# Patient Record
Sex: FEMALE | Race: WHITE | NOT HISPANIC OR LATINO | URBAN - METROPOLITAN AREA
[De-identification: names, ages, dates, MRNs, and addresses within clinical notes are randomized per-mention and may not be internally consistent; named-entity substitution may affect disease eponyms.]

---

## 2017-02-06 ENCOUNTER — APPOINTMENT (RX ONLY)
Dept: URBAN - METROPOLITAN AREA SURGERY 2 | Facility: SURGERY | Age: 32
Setting detail: DERMATOLOGY
End: 2017-02-06

## 2017-02-06 DIAGNOSIS — Z41.1 ENCOUNTER FOR COSMETIC SURGERY: ICD-10-CM

## 2017-02-06 PROCEDURE — ? PATIENT SPECIFIC COUNSELING

## 2017-02-06 NOTE — PROCEDURE: PATIENT SPECIFIC COUNSELING
Other (Free Text): Due to the patient being pregnant, I advised the patient to not receive the Botox today and to wait until after the baby is born.\\nThe patient stated concerns about stretch marks. I suggested the patient try olive oil or vitamin E oil to help it. I also mentioned Microneedling.
Detail Level: Simple

## 2017-03-06 ENCOUNTER — RX ONLY (OUTPATIENT)
Age: 32
Setting detail: RX ONLY
End: 2017-03-06

## 2017-03-06 RX ORDER — CLINDAMYCIN PHOSPHATE AND BENZOYL PEROXIDE 10; 25 MG/G; MG/G
GEL TOPICAL
Qty: 1 | Refills: 3 | Status: ERX | COMMUNITY
Start: 2017-03-06

## 2017-03-06 RX ORDER — CLINDAMYCIN PHOSPHATE AND BENZOYL PEROXIDE 10; 25 MG/G; MG/G
1 GEL TOPICAL
Qty: 1 | Refills: 3 | Status: CANCELLED

## 2017-05-08 ENCOUNTER — APPOINTMENT (RX ONLY)
Dept: URBAN - METROPOLITAN AREA SURGERY 2 | Facility: SURGERY | Age: 32
Setting detail: DERMATOLOGY
End: 2017-05-08

## 2017-05-08 DIAGNOSIS — Z41.1 ENCOUNTER FOR COSMETIC SURGERY: ICD-10-CM

## 2017-05-08 PROCEDURE — ? BOTOX

## 2017-05-08 NOTE — PROCEDURE: BOTOX
Show Price In Note?: no
Consent: Written consent was obtained prior to the procedure. Risks, benefits, expectations and alternatives were discussed including, but not limited to, infection, bleeding, lid/brow ptosis, bruising, swelling, diplopia, temporary effects, incomplete chemical denervation and dissatisfaction with the cosmetic outcome. No guarantee or warranty was given or implied regarding longevity of results.
Detail Level: Simple
Forehead Units: 0
Lot #: 
Price Per Unit In $ (Use Numbers Only, No Text Please.): 17
Additional Area 1 Location: Vermilion lip
Dilution (U/0.1 Cc): 5
Glabellar Complex Units: 15
Expiration Date (Month Year): 10/19
Map Statment: See attached map for complete details
Additional Area 2 Location: Nose
Postcare Instructions: Patient instructed to not lie down for 4 hours and limit physical activity for 24 hours. Patient instructed not to travel by airplane for 48 hours.
Bill Summary Price Listed Below, Or Bill Total Of Units X Price Per Unit?: Bill #Units x Price Per Unit
Additional Area 3 Location: Tear trough

## 2018-05-07 ENCOUNTER — APPOINTMENT (RX ONLY)
Dept: URBAN - METROPOLITAN AREA CLINIC 12 | Facility: CLINIC | Age: 33
Setting detail: DERMATOLOGY
End: 2018-05-07

## 2018-05-07 DIAGNOSIS — Z41.1 ENCOUNTER FOR COSMETIC SURGERY: ICD-10-CM

## 2018-05-07 PROCEDURE — ? CONSULTATION - AGING FACE

## 2018-05-07 PROCEDURE — ? BOTOX

## 2018-05-07 NOTE — PROCEDURE: BOTOX
Detail Level: Detailed
Additional Area 3 Units: 0
Expiration Date (Month Year): 12/2020
Map Statment: See attached map for complete details
Glabellar Complex Units: 15
Dilution (U/0.1 Cc): 4
Consent: Written consent was obtained prior to the procedure. Risks, benefits, expectations and alternatives were discussed including, but not limited to, infection, bleeding, lid/brow ptosis, bruising, swelling, diplopia, temporary effects, incomplete chemical denervation and dissatisfaction with the cosmetic outcome. No guarantee or warranty was given or implied regarding longevity of results.
Lot #: [id number]
Summary Price $ (Use Numbers Only, No Text Please.): 255
Postcare Instructions: Patient instructed to not lie down for 4 hours and limit physical activity for 24 hours. Patient instructed not to travel by airplane for 48 hours.
Show Price In Note?: no
Administered By (Optional): Dr. Kate
Bill Summary Price Listed Below, Or Bill Total Of Units X Price Per Unit?: Bill Summary Price Below

## 2018-11-05 ENCOUNTER — APPOINTMENT (RX ONLY)
Dept: URBAN - METROPOLITAN AREA CLINIC 12 | Facility: CLINIC | Age: 33
Setting detail: DERMATOLOGY
End: 2018-11-05

## 2018-11-05 DIAGNOSIS — Z41.1 ENCOUNTER FOR COSMETIC SURGERY: ICD-10-CM

## 2018-11-05 PROCEDURE — ? BOTOX

## 2018-11-05 NOTE — PROCEDURE: BOTOX
Map Statment: See attached map for complete details
Detail Level: Detailed
Forehead Units: 0
Show Price In Note?: yes
Consent: Written consent was obtained prior to the procedure. Risks, benefits, expectations and alternatives were discussed including, but not limited to, infection, bleeding, lid/brow ptosis, bruising, swelling, diplopia, temporary effects, incomplete chemical denervation and dissatisfaction with the cosmetic outcome. No guarantee or warranty was given or implied regarding longevity of results.
Use Map Statement For Sites (Optional): No
Additional Area 3 Location: Posterior Neck
Expiration Date (Month Year): 05/2021
Dilution (U/0.1 Cc): 5
Postcare Instructions: Patient instructed to not lie down for 4 hours and limit physical activity for 24 hours. Patient instructed not to travel by airplane for 48 hours.
Additional Area 2 Location: upper lip
Additional Area 1 Location: Chin
Price Per Unit In $ (Use Numbers Only, No Text Please.): 17
Glabellar Complex Units: 15
Administered By (Optional): Dr. Kate
Bill Summary Price Listed Below, Or Bill Total Of Units X Price Per Unit?: Bill #Units x Price Per Unit
Lot #: [id number]

## 2019-06-17 ENCOUNTER — APPOINTMENT (RX ONLY)
Dept: URBAN - METROPOLITAN AREA CLINIC 12 | Facility: CLINIC | Age: 34
Setting detail: DERMATOLOGY
End: 2019-06-17

## 2019-06-17 DIAGNOSIS — Z41.9 ENCOUNTER FOR PROCEDURE FOR PURPOSES OTHER THAN REMEDYING HEALTH STATE, UNSPECIFIED: ICD-10-CM

## 2019-06-17 DIAGNOSIS — Z41.1 ENCOUNTER FOR COSMETIC SURGERY: ICD-10-CM

## 2019-06-17 PROCEDURE — ? BOTOX

## 2019-06-17 PROCEDURE — ? DERMASWEEP

## 2019-06-17 ASSESSMENT — LOCATION DETAILED DESCRIPTION DERM
LOCATION DETAILED: LEFT CHIN
LOCATION DETAILED: RIGHT INFERIOR LATERAL MALAR CHEEK
LOCATION DETAILED: LEFT SUPERIOR LATERAL NECK
LOCATION DETAILED: LEFT INFERIOR CENTRAL MALAR CHEEK
LOCATION DETAILED: RIGHT SUPERIOR ANTERIOR NECK
LOCATION DETAILED: RIGHT FOREHEAD
LOCATION DETAILED: NASAL TIP
LOCATION DETAILED: SUBMENTAL CHIN

## 2019-06-17 ASSESSMENT — LOCATION ZONE DERM
LOCATION ZONE: FACE
LOCATION ZONE: NECK
LOCATION ZONE: NOSE

## 2019-06-17 ASSESSMENT — LOCATION SIMPLE DESCRIPTION DERM
LOCATION SIMPLE: NOSE
LOCATION SIMPLE: RIGHT ANTERIOR NECK
LOCATION SIMPLE: RIGHT CHEEK
LOCATION SIMPLE: RIGHT FOREHEAD
LOCATION SIMPLE: CHIN
LOCATION SIMPLE: LEFT ANTERIOR NECK
LOCATION SIMPLE: LEFT CHEEK
LOCATION SIMPLE: SUBMENTAL CHIN

## 2019-06-17 NOTE — PROCEDURE: DERMASWEEP
Indication: acne
Type Of Dermasweep: Dermasweep with Epi Infusion
Number Of Passes: 4
Price (Use Numbers Only, No Special Characters Or $): 150
Consent: Written consent obtained, risks reviewed including but not limited to crusting, scabbing, blistering, scarring, darker or lighter pigmentary change, bruising, and/or incomplete response.
Detail Level: Zone
Post-Care Instructions: I reviewed with the patient in detail post-care instructions. Patient should stay away from the sun and wear sun protection until treated areas are fully healed.
Treatment Number: 1
Endpoint: mild erythema
Vacuum Pressure: 6
Vacuum Pressure Units: inches Hg
Bristle Tip: Black
Infusion Solution: Vitamin C

## 2019-06-17 NOTE — PROCEDURE: BOTOX
Additional Area 2 Units: 0
Show Price In Note?: no
Postcare Instructions: Patient instructed to not lie down for 4 hours and limit physical activity for 24 hours. Patient instructed not to travel by airplane for 48 hours.
Lot #: [id number]
Map Statment: See attached map for complete details
Detail Level: Detailed
Expiration Date (Month Year): 10/2021
Glabellar Complex Units: 15
Consent: Written consent was obtained prior to the procedure. Risks, benefits, expectations and alternatives were discussed including, but not limited to, infection, bleeding, lid/brow ptosis, bruising, swelling, diplopia, temporary effects, incomplete chemical denervation and dissatisfaction with the cosmetic outcome. No guarantee or warranty was given or implied regarding longevity of results.
Bill Summary Price Listed Below, Or Bill Total Of Units X Price Per Unit?: Bill #Units x Price Per Unit
Dilution (U/0.1 Cc): 4
Price Per Unit In $ (Use Numbers Only, No Text Please.): 17

## 2019-12-17 ENCOUNTER — APPOINTMENT (RX ONLY)
Dept: URBAN - METROPOLITAN AREA CLINIC 12 | Facility: CLINIC | Age: 34
Setting detail: DERMATOLOGY
End: 2019-12-17

## 2019-12-17 DIAGNOSIS — Z41.9 ENCOUNTER FOR PROCEDURE FOR PURPOSES OTHER THAN REMEDYING HEALTH STATE, UNSPECIFIED: ICD-10-CM

## 2019-12-17 PROCEDURE — ? BOTOX

## 2019-12-17 NOTE — PROCEDURE: BOTOX
Expiration Date (Month Year): 5/22
Periorbital Skin Units: 0
Administered By (Optional): Shirley hall RN
Consent: Written consent was obtained prior to the procedure. Risks, benefits, expectations and alternatives were discussed including, but not limited to, infection, bleeding, lid/brow ptosis, bruising, swelling, diplopia, temporary effects, incomplete chemical denervation and dissatisfaction with the cosmetic outcome. No guarantee or warranty was given or implied regarding longevity of results.
Detail Level: Detailed
Postcare Instructions: Patient instructed to not lie down for 4 hours and limit physical activity for 24 hours. Patient instructed not to travel by airplane for 48 hours.
Dilution (U/0.1 Cc): 1
Price Per Unit In $ (Use Numbers Only, No Text Please.): 17
Map Statment: See attached map for complete details
Summary Price $ (Use Numbers Only, No Text Please.): 323
Forehead Units: 4
Bill Summary Price Listed Below, Or Bill Total Of Units X Price Per Unit?: Bill #Units x Price Per Unit
Use Map Statement For Sites (Optional): No
Lot #: [id number]
Glabellar Complex Units: 15

## 2020-12-07 ENCOUNTER — APPOINTMENT (RX ONLY)
Dept: URBAN - METROPOLITAN AREA CLINIC 12 | Facility: CLINIC | Age: 35
Setting detail: DERMATOLOGY
End: 2020-12-07

## 2020-12-07 DIAGNOSIS — Z41.1 ENCOUNTER FOR COSMETIC SURGERY: ICD-10-CM

## 2020-12-07 PROCEDURE — ? PATIENT SPECIFIC COUNSELING

## 2020-12-07 PROCEDURE — ? BOTOX

## 2020-12-07 NOTE — PROCEDURE: PATIENT SPECIFIC COUNSELING
Other (Free Text): Patient present today for aging face Botox treatment. Patient last saw Shirley in December of 2019, where she was treated with Botox in the glabellar and forehead region. Dr. Kate examined patient and injected 25 units of Botox to the glabellar and forehead regions. Patient agreed and understood.
Detail Level: Simple

## 2020-12-07 NOTE — PROCEDURE: BOTOX
Glabellar Complex Units: 10
Price Per Unit In $ (Use Numbers Only, No Text Please.): 17
Perioral Units: 0
Use Map Statement For Sites (Optional): No
Expiration Date (Month Year): 08/2023
Detail Level: Detailed
Dilution (U/0.1 Cc): 4
Bill Summary Price Listed Below, Or Bill Total Of Units X Price Per Unit?: Bill #Units x Price Per Unit
Consent: Written consent was obtained prior to the procedure. Risks, benefits, expectations and alternatives were discussed including, but not limited to, infection, bleeding, lid/brow ptosis, bruising, swelling, diplopia, temporary effects, incomplete chemical denervation and dissatisfaction with the cosmetic outcome. No guarantee or warranty was given or implied regarding longevity of results.
Postcare Instructions: Patient instructed to not lie down for 4 hours and limit physical activity for 24 hours. Patient instructed not to travel by airplane for 48 hours.
Forehead Units: 15
Map Statment: See attached map for complete details
Show Price In Note?: yes
Lot #: [id number]

## 2021-06-10 ENCOUNTER — APPOINTMENT (RX ONLY)
Dept: URBAN - METROPOLITAN AREA CLINIC 12 | Facility: CLINIC | Age: 36
Setting detail: DERMATOLOGY
End: 2021-06-10

## 2021-06-10 DIAGNOSIS — Z41.9 ENCOUNTER FOR PROCEDURE FOR PURPOSES OTHER THAN REMEDYING HEALTH STATE, UNSPECIFIED: ICD-10-CM

## 2021-06-10 PROCEDURE — ? BOTOX

## 2021-06-10 NOTE — PROCEDURE: BOTOX
Dilution (U/0.1 Cc): 5
Expiration Date (Month Year): 9/23
Additional Area 1 Units: 0
Administered By (Optional): FLORY Vidal
Consent: Written consent was obtained prior to the procedure. Risks, benefits, expectations and alternatives were discussed including, but not limited to, infection, bleeding, lid/brow ptosis, bruising, swelling, diplopia, temporary effects, incomplete chemical denervation and dissatisfaction with the cosmetic outcome. No guarantee or warranty was given or implied regarding longevity of results.
Price Per Unit In $ (Use Numbers Only, No Text Please.): 14
Detail Level: Detailed
Reconstitution Date: 6/9/21
Show Price In Note?: yes
Bill Summary Price Listed Below, Or Bill Total Of Units X Price Per Unit?: Bill Summary Price Below
Lot #: [id number]
Summary Price $ (Use Numbers Only, No Text Please.): 350
Postcare Instructions: Patient instructed to not lie down for 4 hours and limit physical activity for 24 hours. Patient instructed not to travel by airplane for 48 hours.
Map Statment: See attached map for complete details
Use Map Statement For Sites (Optional): No
Additional Area 2 Location: masseter
Additional Area 1 Location: chin
Glabellar Complex Units: 20

## 2021-12-20 ENCOUNTER — APPOINTMENT (RX ONLY)
Dept: URBAN - METROPOLITAN AREA CLINIC 12 | Facility: CLINIC | Age: 36
Setting detail: DERMATOLOGY
End: 2021-12-20

## 2021-12-20 DIAGNOSIS — Z41.9 ENCOUNTER FOR PROCEDURE FOR PURPOSES OTHER THAN REMEDYING HEALTH STATE, UNSPECIFIED: ICD-10-CM

## 2021-12-20 PROCEDURE — ? CONSULTATION - FILLERS

## 2021-12-20 PROCEDURE — ? ADDITIONAL NOTES

## 2021-12-20 PROCEDURE — ? BOTOX

## 2021-12-20 PROCEDURE — ? JUVEDERM VOLUMA XC INJECTION

## 2021-12-20 NOTE — PROCEDURE: JUVEDERM VOLUMA XC INJECTION
Nasolabial Folds Filler Volume In Cc: 0
Map Statment: See Attach Map for Complete Details
Procedural Text: The filler was administered to the treatment areas noted above.
Detail Level: Detailed
Additional Anesthesia Volume In Cc: 6
Expiration Date (Month Year): 12/4/22
Additional Area 1 Location: chin
Use Map Statement For Sites (Optional): No
Anesthesia Volume In Cc: 0.5
Consent: Written consent obtained. Risks include but not limited to bruising, beading, irregular texture, ulceration, infection, allergic reaction, scar formation, incomplete augmentation, temporary nature, procedural pain.
Number Of Syringes (Required For Inventory): 1
Lot #: [id number]
Post-Care Instructions: Patient instructed to apply ice to reduce swelling.
Price (Use Numbers Only, No Special Characters Or $): 769
Anesthesia Type: 1% lidocaine with epinephrine
Filler: Juvederm Voluma XC

## 2021-12-20 NOTE — PROCEDURE: CONSULTATION - FILLERS
Decollete Primary Filler Volume In Cc (Estimated): 0
Additional Area 1 Location: chin
Jawline Primary Filler Volume In Cc (Estimated): 2
Additional Area 1 Filler (Primary): Juvederm Voluma XC
Detail Level: Detailed
Send Procedure Quote As Charge: No
Additional Area 2 Location: submentum

## 2021-12-20 NOTE — PROCEDURE: BOTOX
Periorbital Skin Units: 0
Additional Area 2 Location: masseter
Summary Price $ (Use Numbers Only, No Text Please.): 350
Additional Area 3 Location: gummy smile
Expiration Date (Month Year): 3/24
Administered By (Optional): FLORY Vidal
Price Per Unit In $ (Use Numbers Only, No Text Please.): 14
Additional Area 1 Location: chin
Map Statment: See attached map for complete details
Forehead Units: 5
Lot #: [id number]
Consent: Written consent was obtained prior to the procedure. Risks, benefits, expectations and alternatives were discussed including, but not limited to, infection, bleeding, lid/brow ptosis, bruising, swelling, diplopia, temporary effects, incomplete chemical denervation and dissatisfaction with the cosmetic outcome. No guarantee or warranty was given or implied regarding longevity of results.
Postcare Instructions: Patient instructed to not lie down for 4 hours and limit physical activity for 24 hours. Patient instructed not to travel by airplane for 48 hours.
Glabellar Complex Units: 20
Use Map Statement For Sites (Optional): No
Show Price In Note?: yes
Detail Level: Detailed
Bill Summary Price Listed Below, Or Bill Total Of Units X Price Per Unit?: Bill Summary Price Below

## 2022-01-07 ENCOUNTER — APPOINTMENT (RX ONLY)
Dept: URBAN - METROPOLITAN AREA CLINIC 12 | Facility: CLINIC | Age: 37
Setting detail: DERMATOLOGY
End: 2022-01-07

## 2022-05-04 ENCOUNTER — APPOINTMENT (RX ONLY)
Dept: URBAN - METROPOLITAN AREA CLINIC 12 | Facility: CLINIC | Age: 37
Setting detail: DERMATOLOGY
End: 2022-05-04

## 2022-05-04 DIAGNOSIS — Z41.9 ENCOUNTER FOR PROCEDURE FOR PURPOSES OTHER THAN REMEDYING HEALTH STATE, UNSPECIFIED: ICD-10-CM

## 2022-05-04 PROCEDURE — ? BOTOX

## 2022-05-04 NOTE — PROCEDURE: BOTOX
Periorbital Skin Units: 0
Additional Area 2 Location: masseter
Summary Price $ (Use Numbers Only, No Text Please.): 375
Additional Area 3 Location: gummy smile
Expiration Date (Month Year): 5/24
Administered By (Optional): FLORY Vidal
Price Per Unit In $ (Use Numbers Only, No Text Please.): 15
Additional Area 1 Location: chin
Map Statment: See attached map for complete details
Forehead Units: 5
Lot #: [id number]
Consent: Written consent was obtained prior to the procedure. Risks, benefits, expectations and alternatives were discussed including, but not limited to, infection, bleeding, lid/brow ptosis, bruising, swelling, diplopia, temporary effects, incomplete chemical denervation and dissatisfaction with the cosmetic outcome. No guarantee or warranty was given or implied regarding longevity of results.
Postcare Instructions: Patient instructed to not lie down for 4 hours and limit physical activity for 24 hours. Patient instructed not to travel by airplane for 48 hours.
Glabellar Complex Units: 20
Use Map Statement For Sites (Optional): No
Show Price In Note?: yes
Detail Level: Detailed
Bill Summary Price Listed Below, Or Bill Total Of Units X Price Per Unit?: Bill Summary Price Below

## 2022-08-30 ENCOUNTER — APPOINTMENT (RX ONLY)
Dept: URBAN - METROPOLITAN AREA CLINIC 12 | Facility: CLINIC | Age: 37
Setting detail: DERMATOLOGY
End: 2022-08-30

## 2022-08-30 DIAGNOSIS — Z41.9 ENCOUNTER FOR PROCEDURE FOR PURPOSES OTHER THAN REMEDYING HEALTH STATE, UNSPECIFIED: ICD-10-CM

## 2022-08-30 PROCEDURE — ? BOTOX

## 2022-08-30 NOTE — PROCEDURE: BOTOX
Periorbital Skin Units: 0
Additional Area 2 Location: masseter
Summary Price $ (Use Numbers Only, No Text Please.): 350
Additional Area 3 Location: gummy smile
Expiration Date (Month Year): 7/24
Administered By (Optional): FLORY Vidal
Price Per Unit In $ (Use Numbers Only, No Text Please.): 14
Additional Area 1 Location: chin
Map Statment: See attached map for complete details
Forehead Units: 5
Lot #: [id number]
Consent: Written consent was obtained prior to the procedure. Risks, benefits, expectations and alternatives were discussed including, but not limited to, infection, bleeding, lid/brow ptosis, bruising, swelling, diplopia, temporary effects, incomplete chemical denervation and dissatisfaction with the cosmetic outcome. No guarantee or warranty was given or implied regarding longevity of results.
Postcare Instructions: Patient instructed to not lie down for 4 hours and limit physical activity for 24 hours. Patient instructed not to travel by airplane for 48 hours.
Glabellar Complex Units: 20
Use Map Statement For Sites (Optional): No
Show Price In Note?: yes
Detail Level: Detailed
Bill Summary Price Listed Below, Or Bill Total Of Units X Price Per Unit?: Bill Summary Price Below

## 2022-09-13 ENCOUNTER — APPOINTMENT (RX ONLY)
Dept: URBAN - METROPOLITAN AREA CLINIC 12 | Facility: CLINIC | Age: 37
Setting detail: DERMATOLOGY
End: 2022-09-13

## 2022-09-13 DIAGNOSIS — Z41.1 ENCOUNTER FOR COSMETIC SURGERY: ICD-10-CM

## 2022-09-13 PROCEDURE — ? PATIENT SPECIFIC COUNSELING

## 2022-09-13 PROCEDURE — ? CONSULTATION - MOMMY MAKEOVER

## 2022-09-13 NOTE — PROCEDURE: PATIENT SPECIFIC COUNSELING
Detail Level: Simple
Other (Free Text): Patient present today for [facility] consult. Patient reports her areas of concern are abdomen and inner and outer thighs. Patient voiced she would like to hold off on the breasts. Patient reports previous c section scar. Patient voiced she will not have anymore children in the future. Dr. Kate examined patient and voiced no bellybutton hernia and no sign of diastasis. Dr. Kate discussed surgical options with patient, including abdominoplasty, extensive liposuction to flank, inner, and outer thighs, and all incision sites. Dr. Kate voiced patient will stay overnight in surgery center. Patient verbalized agreement and understanding of all risk, surgery, and healing factors. Kaylee stepped in to give the patient a quote.

## 2022-12-19 ENCOUNTER — APPOINTMENT (RX ONLY)
Dept: URBAN - METROPOLITAN AREA CLINIC 12 | Facility: CLINIC | Age: 37
Setting detail: DERMATOLOGY
End: 2022-12-19

## 2022-12-19 DIAGNOSIS — Z41.9 ENCOUNTER FOR PROCEDURE FOR PURPOSES OTHER THAN REMEDYING HEALTH STATE, UNSPECIFIED: ICD-10-CM

## 2022-12-19 PROCEDURE — ? BOTOX

## 2022-12-19 NOTE — PROCEDURE: BOTOX
Periorbital Skin Units: 0
Additional Area 2 Location: masseter
Additional Area 3 Location: gummy smile
Expiration Date (Month Year): 2/25
Administered By (Optional): Michael Jay PA-C
Price Per Unit In $ (Use Numbers Only, No Text Please.): 15
Additional Area 1 Location: chin
Map Statment: See attached map for complete details
Forehead Units: 5
Lot #: [id number]
Consent: Written consent was obtained prior to the procedure. Risks, benefits, expectations and alternatives were discussed including, but not limited to, infection, bleeding, lid/brow ptosis, bruising, swelling, diplopia, temporary effects, incomplete chemical denervation and dissatisfaction with the cosmetic outcome. No guarantee or warranty was given or implied regarding longevity of results.
Postcare Instructions: Patient instructed to not lie down for 4 hours and limit physical activity for 24 hours. Patient instructed not to travel by airplane for 48 hours.
Glabellar Complex Units: 20
Use Map Statement For Sites (Optional): No
Show Price In Note?: yes
Detail Level: Detailed
Bill Summary Price Listed Below, Or Bill Total Of Units X Price Per Unit?: Bill #Units x Price Per Unit

## 2023-03-20 ENCOUNTER — APPOINTMENT (RX ONLY)
Dept: URBAN - METROPOLITAN AREA CLINIC 12 | Facility: CLINIC | Age: 38
Setting detail: DERMATOLOGY
End: 2023-03-20

## 2023-03-20 DIAGNOSIS — Z41.9 ENCOUNTER FOR PROCEDURE FOR PURPOSES OTHER THAN REMEDYING HEALTH STATE, UNSPECIFIED: ICD-10-CM

## 2023-03-20 PROCEDURE — ? BOTOX

## 2023-03-20 NOTE — PROCEDURE: BOTOX
Periorbital Skin Units: 0
Additional Area 2 Location: masseter
Additional Area 3 Location: gummy smile
Expiration Date (Month Year): 4/25
Administered By (Optional): Michael Jay PA-C
Price Per Unit In $ (Use Numbers Only, No Text Please.): 14
Additional Area 1 Location: chin
Map Statment: See attached map for complete details
Forehead Units: 5
Lot #: [id number]
Consent: Written consent was obtained prior to the procedure. Risks, benefits, expectations and alternatives were discussed including, but not limited to, infection, bleeding, lid/brow ptosis, bruising, swelling, diplopia, temporary effects, incomplete chemical denervation and dissatisfaction with the cosmetic outcome. No guarantee or warranty was given or implied regarding longevity of results.
Postcare Instructions: Patient instructed to not lie down for 4 hours and limit physical activity for 24 hours. Patient instructed not to travel by airplane for 48 hours.
Glabellar Complex Units: 20
Use Map Statement For Sites (Optional): No
Show Price In Note?: yes
Detail Level: Detailed
Bill Summary Price Listed Below, Or Bill Total Of Units X Price Per Unit?: Bill #Units x Price Per Unit

## 2023-08-04 ENCOUNTER — APPOINTMENT (RX ONLY)
Dept: URBAN - METROPOLITAN AREA CLINIC 12 | Facility: CLINIC | Age: 38
Setting detail: DERMATOLOGY
End: 2023-08-04

## 2023-08-04 DIAGNOSIS — Z41.9 ENCOUNTER FOR PROCEDURE FOR PURPOSES OTHER THAN REMEDYING HEALTH STATE, UNSPECIFIED: ICD-10-CM

## 2023-08-04 PROCEDURE — ? CONSULTATION - FILLERS

## 2023-08-04 PROCEDURE — ? BOTOX

## 2023-08-04 NOTE — PROCEDURE: CONSULTATION - FILLERS
Dorsal Hands Secondary Filler Volume In Cc (Estimated): 0
Additional Area 4 Location: nose
Additional Area 1 Location: neck
Additional Area 3 Location: perioral lines
Additional Area 1 Filler (Primary): Teosyal Redensity 1
Temples Filler (Primary): Juvederm Voluma XC
Send Procedure Quote As Charge: No
Detail Level: Detailed
Additional Area 2 Location: chin

## 2024-01-30 ENCOUNTER — APPOINTMENT (RX ONLY)
Dept: URBAN - METROPOLITAN AREA CLINIC 12 | Facility: CLINIC | Age: 39
Setting detail: DERMATOLOGY
End: 2024-01-30

## 2024-01-30 DIAGNOSIS — Z41.9 ENCOUNTER FOR PROCEDURE FOR PURPOSES OTHER THAN REMEDYING HEALTH STATE, UNSPECIFIED: ICD-10-CM

## 2024-01-30 PROCEDURE — ? BOTOX

## 2024-01-30 NOTE — PROCEDURE: BOTOX
Periorbital Skin Units: 0
Additional Area 2 Location: masseter
Additional Area 3 Location: gummy smile
Expiration Date (Month Year): 3/26
Administered By (Optional): Michael Jay PA-C
Price Per Unit In $ (Use Numbers Only, No Text Please.): 15
Additional Area 1 Location: chin
Map Statment: See attached map for complete details
Forehead Units: 5
Lot #: [id number]
Consent: Written consent was obtained prior to the procedure. Risks, benefits, expectations and alternatives were discussed including, but not limited to, infection, bleeding, lid/brow ptosis, bruising, swelling, diplopia, temporary effects, incomplete chemical denervation and dissatisfaction with the cosmetic outcome. No guarantee or warranty was given or implied regarding longevity of results.
Postcare Instructions: Patient instructed to not lie down for 4 hours and limit physical activity for 24 hours. Patient instructed not to travel by airplane for 48 hours.
Glabellar Complex Units: 20
Use Map Statement For Sites (Optional): No
Show Price In Note?: yes
Detail Level: Detailed
Bill Summary Price Listed Below, Or Bill Total Of Units X Price Per Unit?: Bill #Units x Price Per Unit

## 2025-02-24 ENCOUNTER — APPOINTMENT (OUTPATIENT)
Dept: URBAN - METROPOLITAN AREA CLINIC 12 | Facility: CLINIC | Age: 40
Setting detail: DERMATOLOGY
End: 2025-02-24

## 2025-02-24 DIAGNOSIS — Z41.9 ENCOUNTER FOR PROCEDURE FOR PURPOSES OTHER THAN REMEDYING HEALTH STATE, UNSPECIFIED: ICD-10-CM

## 2025-02-24 PROCEDURE — ? BOTOX

## 2025-02-24 NOTE — PROCEDURE: BOTOX
Periorbital Skin Units: 0
Additional Area 2 Location: masseter
Summary Price $ (Use Numbers Only, No Text Please.): 450
Additional Area 3 Location: gummy smile
Expiration Date (Month Year): 2/2027
Administered By (Optional): Michael Jay PA-C
Price Per Unit In $ (Use Numbers Only, No Text Please.): 15
Additional Area 1 Location: chin
Map Statment: See attached map for complete details
Forehead Units: 10
Lot #: [id number]
Consent: Written consent was obtained prior to the procedure. Risks, benefits, expectations and alternatives were discussed including, but not limited to, infection, bleeding, lid/brow ptosis, bruising, swelling, diplopia, temporary effects, incomplete chemical denervation and dissatisfaction with the cosmetic outcome. No guarantee or warranty was given or implied regarding longevity of results.
Postcare Instructions: Patient instructed to not lie down for 4 hours and limit physical activity for 24 hours. Patient instructed not to travel by airplane for 48 hours.
Glabellar Complex Units: 20
Use Map Statement For Sites (Optional): No
Show Price In Note?: yes
Detail Level: Detailed
Dilution (U/0.1 Cc): 5
Bill Summary Price Listed Below, Or Bill Total Of Units X Price Per Unit?: Bill #Units x Price Per Unit

## 2025-08-06 ENCOUNTER — APPOINTMENT (OUTPATIENT)
Dept: URBAN - METROPOLITAN AREA CLINIC 12 | Facility: CLINIC | Age: 40
Setting detail: DERMATOLOGY
End: 2025-08-06

## 2025-08-06 DIAGNOSIS — Z41.9 ENCOUNTER FOR PROCEDURE FOR PURPOSES OTHER THAN REMEDYING HEALTH STATE, UNSPECIFIED: ICD-10-CM

## 2025-08-06 PROCEDURE — ? BOTOX
